# Patient Record
Sex: FEMALE | Race: WHITE
[De-identification: names, ages, dates, MRNs, and addresses within clinical notes are randomized per-mention and may not be internally consistent; named-entity substitution may affect disease eponyms.]

---

## 2017-05-12 ENCOUNTER — HOSPITAL ENCOUNTER (EMERGENCY)
Dept: HOSPITAL 45 - C.EDB | Age: 22
Discharge: HOME | End: 2017-05-12
Payer: COMMERCIAL

## 2017-05-12 VITALS
WEIGHT: 293 LBS | BODY MASS INDEX: 43.4 KG/M2 | BODY MASS INDEX: 43.4 KG/M2 | HEIGHT: 69.02 IN | WEIGHT: 293 LBS | HEIGHT: 69.02 IN

## 2017-05-12 VITALS — OXYGEN SATURATION: 96 % | SYSTOLIC BLOOD PRESSURE: 110 MMHG | HEART RATE: 107 BPM | DIASTOLIC BLOOD PRESSURE: 86 MMHG

## 2017-05-12 VITALS — TEMPERATURE: 98.06 F

## 2017-05-12 DIAGNOSIS — Z83.3: ICD-10-CM

## 2017-05-12 DIAGNOSIS — I10: ICD-10-CM

## 2017-05-12 DIAGNOSIS — Z84.1: ICD-10-CM

## 2017-05-12 DIAGNOSIS — Z79.899: ICD-10-CM

## 2017-05-12 DIAGNOSIS — Z82.49: ICD-10-CM

## 2017-05-12 DIAGNOSIS — J20.9: Primary | ICD-10-CM

## 2017-05-12 LAB
ANION GAP SERPL CALC-SCNC: 7 MMOL/L (ref 3–11)
BASOPHILS # BLD: 0.02 K/UL (ref 0–0.2)
BASOPHILS NFR BLD: 0.1 %
BUN SERPL-MCNC: 15 MG/DL (ref 7–18)
BUN/CREAT SERPL: 17.5 (ref 10–20)
CALCIUM SERPL-MCNC: 9.3 MG/DL (ref 8.5–10.1)
CHLORIDE SERPL-SCNC: 105 MMOL/L (ref 98–107)
CO2 SERPL-SCNC: 29 MMOL/L (ref 21–32)
COMPLETE: YES
CREAT CL PREDICTED SERPL C-G-VRATE: 155.7 ML/MIN
CREAT SERPL-MCNC: 0.88 MG/DL (ref 0.6–1.2)
EOSINOPHIL NFR BLD AUTO: 342 K/UL (ref 130–400)
GLUCOSE SERPL-MCNC: 91 MG/DL (ref 70–99)
HCT VFR BLD CALC: 43.4 % (ref 37–47)
IG%: 0.3 %
IMM GRANULOCYTES NFR BLD AUTO: 21.9 %
LYMPHOCYTES # BLD: 2.99 K/UL (ref 1.2–3.4)
MCH RBC QN AUTO: 27.6 PG (ref 25–34)
MCHC RBC AUTO-ENTMCNC: 33.2 G/DL (ref 32–36)
MCV RBC AUTO: 83.3 FL (ref 80–100)
MONOCYTES NFR BLD: 6.1 %
NEUTROPHILS # BLD AUTO: 3 %
NEUTROPHILS NFR BLD AUTO: 68.6 %
PMV BLD AUTO: 10.2 FL (ref 7.4–10.4)
POTASSIUM SERPL-SCNC: 4 MMOL/L (ref 3.5–5.1)
RBC # BLD AUTO: 5.21 M/UL (ref 4.2–5.4)
SODIUM SERPL-SCNC: 141 MMOL/L (ref 136–145)
WBC # BLD AUTO: 13.68 K/UL (ref 4.8–10.8)

## 2017-05-12 NOTE — EMERGENCY ROOM VISIT NOTE
History


First contact with patient:  15:56


Chief Complaint:  SHORTNESS OF BREATH


Stated Complaint:  CAN'T CATCH BREATH


Nursing Triage Summary:  


"I can't cath my breath." Reports it started yesterday and has gotten worse 


today. Denies pain





History of Present Illness


The patient is a 22 year old female who presents to the Emergency Room with 

complaints of shortness of breath.  The patient states that she has had allergy 

symptoms over the past 2 weeks, including sneezing, nasal discharge and itchy, 

watery eyes.  She reports that this week, the symptoms "moved into her chest."  

She has had difficulty breathing for the past 2 days.  She is taking Mucinex 

and Zyrtec without relief of her symptoms.  She denies any history of asthma.  

She denies any history of breathing problems.  She does not take birth control 

pills and denies any recent long travel.  She denies any personal or family 

history of blood clots or clotting disorders.  She denies any chest pain.  She 

has a mild, nonproductive cough.  She denies any fevers/chills, leg pain or 

swelling.





Review of Systems


A complete 10 point review of systems was reviewed with the patient with 

pertinent positives and negatives as per history of present illness. All else 

were negative.





Past Medical/Surgical History


Medical Problems:


(1) Hypertension








Family History





Diabetes mellitus


FH: heart disease


Hypertension


Kidney disease


Kidney stones





Social History


Smoking Status:  Never Smoker


Alcohol Use:  none


Drug Use:  none


Marital Status:  in relationship


Housing Status:  lives with family


Occupation Status:  employed





Current/Historical Medications


Scheduled


Azithromycin (Zithromax Z-Terrance), 0 PO UD


Fluoxetine (Prozac), 20 MG PO QAM


Methylprednisolone (Medrol Dosepak), 0 PO DAILY





Scheduled PRN


Guaifenesin (Mucinex Maximum Strength), 1 TAB PO BID PRN for CONGESTION





Allergies


Coded Allergies:  


     No Known Allergies (Unverified , 5/12/17)





Physical Exam


Vital Signs











  Date Time  Temp Pulse Resp B/P Pulse Ox O2 Delivery O2 Flow Rate FiO2


 


5/12/17 18:20  107 20 110/86 96   


 


5/12/17 17:03  103 20 109/76 92 Room Air  


 


5/12/17 16:56  108      


 


5/12/17 16:10     93 Room Air  


 


5/12/17 16:10      Room Air  


 


5/12/17 15:55     93 Room Air  


 


5/12/17 15:52 36.7 109 20 141/97 93 Room Air  











Physical Exam


VITALS: Vitals are noted on the nurse's note and reviewed by myself.  Vital 

signs stable.


GENERAL: This is a 22-year-old female, in no acute distress, nondiaphoretic, 

well-developed well-nourished.


SKIN: The skin was without rashes.


EARS: External auditory canals clear, tympanic membranes pearly gray without 

erythema or effusion bilaterally.


EYES: Pupils equal round and reactive to light and accommodation.  Conjunctivae 

without injection, sclerae without icterus. 


NOSE: Patent, turbinates mildly inflamed with clear discharge.


MOUTH: Mucous membranes moist.  Tonsils are not enlarged.  Pharynx without 

erythema or exudate. 


NECK: Supple without nuchal rigidity.  No lymphadenopathy.  


HEART: Regular rate and rhythm without murmurs gallops or rubs.


LUNGS: Diffuse inspiratory and expiratory wheezes.  No rhonchi or rales.  No 

accessory muscle use.


NEURO: Patient was alert and oriented to person place and time.





Medical Decision & Procedures


ER Provider


Diagnostic Interpretation:


CHEST 2 VIEWS ROUTINE





HISTORY:      cough, wheezing





COMPARISON: None.





FINDINGS: The lungs are clear. Cardiac silhouette is normal in size. No pleural


effusions. No pneumothorax.





IMPRESSION:


No acute process.





Laboratory Results


5/12/17 16:10








Red Blood Count 5.21, Mean Corpuscular Volume 83.3, Mean Corpuscular Hemoglobin 

27.6, Mean Corpuscular Hemoglobin Concent 33.2, Mean Platelet Volume 10.2, 

Neutrophils (%) (Auto) 68.6, Lymphocytes (%) (Auto) 21.9, Monocytes (%) (Auto) 

6.1, Eosinophils (%) (Auto) 3.0, Basophils (%) (Auto) 0.1, Neutrophils # (Auto) 

9.39, Lymphocytes # (Auto) 2.99, Monocytes # (Auto) 0.83, Eosinophils # (Auto) 

0.41, Basophils # (Auto) 0.02





5/12/17 16:10

















Test


  5/12/17


16:10


 


White Blood Count


  13.68 K/uL


(4.8-10.8)


 


Red Blood Count


  5.21 M/uL


(4.2-5.4)


 


Hemoglobin


  14.4 g/dL


(12.0-16.0)


 


Hematocrit 43.4 % (37-47) 


 


Mean Corpuscular Volume


  83.3 fL


()


 


Mean Corpuscular Hemoglobin


  27.6 pg


(25-34)


 


Mean Corpuscular Hemoglobin


Concent 33.2 g/dl


(32-36)


 


Platelet Count


  342 K/uL


(130-400)


 


Mean Platelet Volume


  10.2 fL


(7.4-10.4)


 


Neutrophils (%) (Auto) 68.6 % 


 


Lymphocytes (%) (Auto) 21.9 % 


 


Monocytes (%) (Auto) 6.1 % 


 


Eosinophils (%) (Auto) 3.0 % 


 


Basophils (%) (Auto) 0.1 % 


 


Neutrophils # (Auto)


  9.39 K/uL


(1.4-6.5)


 


Lymphocytes # (Auto)


  2.99 K/uL


(1.2-3.4)


 


Monocytes # (Auto)


  0.83 K/uL


(0.11-0.59)


 


Eosinophils # (Auto)


  0.41 K/uL


(0-0.5)


 


Basophils # (Auto)


  0.02 K/uL


(0-0.2)


 


RDW Standard Deviation


  40.7 fL


(36.4-46.3)


 


RDW Coefficient of Variation


  13.5 %


(11.5-14.5)


 


Immature Granulocyte % (Auto) 0.3 % 


 


Immature Granulocyte # (Auto)


  0.04 K/uL


(0.00-0.02)


 


D-Dimer


  390 ug/L FEU


(0-500)


 


Anion Gap


  7.0 mmol/L


(3-11)


 


Est Creatinine Clear Calc


Drug Dose 155.7 ml/min 


 


 


Estimated GFR (


American) 107.3 


 


 


Estimated GFR (Non-


American 92.6 


 


 


BUN/Creatinine Ratio 17.5 (10-20) 


 


Calcium Level


  9.3 mg/dl


(8.5-10.1)











Medications Administered











 Medications


  (Trade)  Dose


 Ordered  Sig/Lia


 Route  Start Time


 Stop Time Status Last Admin


Dose Admin


 


 Albuterol/


 Ipratropium


  (Duoneb)  3 ml  NOW  STAT


 INH  5/12/17 16:02


 5/12/17 16:04 DC 5/12/17 16:19


3 ML


 


 Methylprednisolone


 Sodium Succinate


  (Solu-Medrol IV)  125 mg  NOW  STAT


 IV  5/12/17 16:02


 5/12/17 16:04 DC 5/12/17 16:19


125 MG


 


 Albuterol/


 Ipratropium


  (Duoneb)  3 ml  NOW  STAT


 INH  5/12/17 17:19


 5/12/17 17:20 DC 5/12/17 17:25


3 ML











ED Course


The patient was evaluated as above.  Labs were drawn and IV access was 

obtained.  





Patient was medicated with a DuoNeb treatment and 125 mg Solu-Medrol.





Chest x-ray was performed and read by radiology as above. 





Patient was reevaluated and felt much better, but was still wheezing.  An 

additional DuoNeb treatment was ordered.





Discharge instructions were reviewed with the patient.  The patient verbalized 

understanding of my assessment and treatment plan and was discharged home in 

good condition.





Medical Decision


Differential diagnosis includes pneumonia, pulmonary embolism, asthma 

exacerbation, upper respiratory infection, among others.





The patient is a 22-year-old female who presents today complaining of 

difficulty breathing.  Exam revealed diffuse wheezes.  Labs revealed a mild 

leukocytosis.  D-dimer was not elevated.  The patient felt much better after 2 

DuoNeb treatments and IV Solu-Medrol.  I feel that she likely has an upper 

respiratory infection and possibly a component of underlying asthma.  After her 

second DuoNeb treatment, she was reevaluated and her lungs sounded much 

clearer.  Her O2 saturations in the room were between 96 and 98%.  She will be 

placed on a Z-Terrance and Medrol Dosepak.  She was given an inhaler for symptomatic 

relief.  She was instructed to follow-up with her primary care provider for 

further evaluation this week.  She will return for any worsening shortness of 

breath or new/concerning symptoms.





Based on the patient's presentation and work up, I feel the patient is stable 

for outpatient treatment.  The patient was educated to return to the emergency 

department for any worsening of their current condition or new/concerning 

symptoms.  She will follow up with her PCP.





Impression





 Primary Impression:  


 Acute bronchitis





Departure Information


Dispostion


Home / Self-Care





Condition


GOOD





Prescriptions





Methylprednisolone (MEDROL DOSEPAK) 4 Mg Terrance


0 PO DAILY, #1 PKT


   Prov: Jazzmine Shipley PA-C         5/12/17 


Azithromycin (ZITHROMAX Z-TERRANCE) 250 Mg Tab


0 PO UD, #1 PKT


   2 TABS DAY 1, THEN 1 TAB DAILY FOR 4 DAYS


   Prov: Jazzmine Shipley PA-C         5/12/17





Referrals


Sabine Sorensen PROME (PCP)





Patient Instructions


My [facility]





Additional Instructions





You were prescribed Zithromax to be taken as prescribed. This is an antibiotic. 

All antibiotics have the potential to cause diarrhea. Stop this medication and 

contact a medical provider if you were to develop any significant adverse side 

effects including: wheezing, shortness of breath, passing out, vomiting, or a 

diffuse rash. Always take antibiotics as directed and COMPLETE the ENTIRE 

course regardless of the improvement of your symptoms.





You have been prescribed a Medrol Dosepak. This is a steroid which will help 

decrease your inflammation. Take the medicine as prescribed.  Take the ENTIRE 6 

day course of the steroids.





Use the inhaler, 2 puffs every 4-6 hours as needed for shortness of breath.





Follow-up with your primary care provider this week for further evaluation.





Return to the emergency department with any worsening shortness of breath, 

fevers, or any other new/concerning symptoms.





Problem Qualifiers








 Primary Impression:  


 Acute bronchitis


 Bronchitis organism:  unspecified organism  Qualified Codes:  J20.9 - Acute 

bronchitis, unspecified

## 2017-05-12 NOTE — DIAGNOSTIC IMAGING REPORT
CHEST 2 VIEWS ROUTINE



HISTORY:      cough, wheezing



COMPARISON: None.



FINDINGS: The lungs are clear. Cardiac silhouette is normal in size. No pleural

effusions. No pneumothorax.



IMPRESSION:

No acute process.







Electronically signed by:  Davon Valle M.D.

5/12/2017 4:49 PM



Dictated Date/Time:  5/12/2017 4:48 PM

## 2018-02-28 ENCOUNTER — HOSPITAL ENCOUNTER (INPATIENT)
Dept: HOSPITAL 45 - C.LD | Age: 23
LOS: 2 days | Discharge: HOME | End: 2018-03-02
Attending: OBSTETRICS & GYNECOLOGY | Admitting: OBSTETRICS & GYNECOLOGY
Payer: COMMERCIAL

## 2018-02-28 VITALS
WEIGHT: 293 LBS | BODY MASS INDEX: 43.4 KG/M2 | BODY MASS INDEX: 43.4 KG/M2 | WEIGHT: 293 LBS | HEIGHT: 69.02 IN | HEIGHT: 69.02 IN

## 2018-02-28 DIAGNOSIS — Z3A.40: ICD-10-CM

## 2018-02-28 LAB
ALBUMIN SERPL-MCNC: 2.4 GM/DL (ref 3.4–5)
ALP SERPL-CCNC: 107 U/L (ref 45–117)
ALT SERPL-CCNC: 19 U/L (ref 12–78)
AST SERPL-CCNC: 13 U/L (ref 15–37)
BUN SERPL-MCNC: 12 MG/DL (ref 7–18)
CALCIUM SERPL-MCNC: 8.9 MG/DL (ref 8.5–10.1)
CO2 SERPL-SCNC: 22 MMOL/L (ref 21–32)
CREAT SERPL-MCNC: 0.62 MG/DL (ref 0.6–1.2)
EOSINOPHIL NFR BLD AUTO: 264 K/UL (ref 130–400)
GLUCOSE SERPL-MCNC: 96 MG/DL (ref 70–99)
HCT VFR BLD CALC: 36.7 % (ref 37–47)
HGB BLD-MCNC: 12.3 G/DL (ref 12–16)
MCH RBC QN AUTO: 27.6 PG (ref 25–34)
MCHC RBC AUTO-ENTMCNC: 33.5 G/DL (ref 32–36)
MCV RBC AUTO: 82.3 FL (ref 80–100)
PMV BLD AUTO: 10.2 FL (ref 7.4–10.4)
POTASSIUM SERPL-SCNC: 4.1 MMOL/L (ref 3.5–5.1)
PROT SERPL-MCNC: 6.6 GM/DL (ref 6.4–8.2)
RED CELL DISTRIBUTION WIDTH CV: 14.6 % (ref 11.5–14.5)
RED CELL DISTRIBUTION WIDTH SD: 43.5 FL (ref 36.4–46.3)
SODIUM SERPL-SCNC: 137 MMOL/L (ref 136–145)
WBC # BLD AUTO: 9.26 K/UL (ref 4.8–10.8)

## 2018-02-28 PROCEDURE — 3E033VJ INTRODUCTION OF OTHER HORMONE INTO PERIPHERAL VEIN, PERCUTANEOUS APPROACH: ICD-10-PCS | Performed by: OBSTETRICS & GYNECOLOGY

## 2018-02-28 RX ADMIN — PENICILLIN G POTASSIUM PRN MLS/HR: 20000000 POWDER, FOR SOLUTION INTRAVENOUS at 17:03

## 2018-02-28 RX ADMIN — SODIUM CHLORIDE, SODIUM LACTATE, POTASSIUM CHLORIDE, AND CALCIUM CHLORIDE SCH MLS/HR: 600; 310; 30; 20 INJECTION, SOLUTION INTRAVENOUS at 19:07

## 2018-02-28 RX ADMIN — PENICILLIN G POTASSIUM PRN MLS/HR: 20000000 POWDER, FOR SOLUTION INTRAVENOUS at 13:04

## 2018-02-28 RX ADMIN — SODIUM CHLORIDE, SODIUM LACTATE, POTASSIUM CHLORIDE, AND CALCIUM CHLORIDE SCH MLS/HR: 600; 310; 30; 20 INJECTION, SOLUTION INTRAVENOUS at 09:07

## 2018-02-28 NOTE — ANESTHESIA PROCEDURE NOTE
Anesthesia Epidural Removal Nt


Date & Time


Feb 28, 2018 at 21:45





Vital Signs


Pain Intensity:  0.0





Notes


Mental Status:  alert / awake / arousable, participated in evaluation


Nausea / Vomiting:  adequately controlled


Pain:  adequately controlled


Airway Patency, RR, SpO2:  stable & adequate


BP & HR:  stable & adequate


Hydration State:  stable & adequate


Neuraxial Anesthesia:  was administered


Anesthetic Complications:  no major complications apparent, pt satisfied with 

anesthetic care


Epidural:  removed without complications, with tip intact

## 2018-02-28 NOTE — DELIVERY SUMMARY
DATE OF OPERATION:  02/28/2018

 

TIME OF DELIVERY OF THE BABY:  2101 p.m. 

 

DELIVERY OF PLACENTA:  2107 p.m.

 

DETAILS OF DELIVERY:  The patient was found to be fully dilated and desired

to push.  She pushed with 3 contractions and delivered the head without

difficulty.  There was a nuchal cord around the neck x1 which was reduced. 

Shoulders were delivered with minimal traction.  Baby was handed off to the

mother where mouth and nose were suctioned.  Cord was clamped x2 and cut at 1

minute delay and then cord blood was obtained.  Baby was taken  by

the nursery team. See their note for details. Vagina and perineum were checked 
for lacerations, there

were no lacerations found and vagina and perineum were intact.  Then placenta

was found to be in the vagina and delivered spontaneous as intact and

complete.  Uterus was explored and found to be empty.  Lower segment was

cleared of all clots and debris.  Fundus was firm.  EBL was 200.  Mom and

baby tolerated the procedure well.  All sponge, lap instrument counts were

correct x2.  Baby was a viable male infant.  Apgars 8/9, weight is 3450 gr. 

No complications happened.  Mom and baby tolerated the procedure well and I

was present during the whole procedure.

 

 

I attest to the content of the Intraoperative Record and any orders documented 
therein. Any exceptions are noted below.

 

 

 

MTDD

## 2018-03-01 VITALS — TEMPERATURE: 97.7 F | SYSTOLIC BLOOD PRESSURE: 118 MMHG | DIASTOLIC BLOOD PRESSURE: 78 MMHG | HEART RATE: 83 BPM

## 2018-03-01 VITALS
SYSTOLIC BLOOD PRESSURE: 119 MMHG | DIASTOLIC BLOOD PRESSURE: 78 MMHG | OXYGEN SATURATION: 98 % | HEART RATE: 80 BPM | TEMPERATURE: 98.24 F

## 2018-03-01 VITALS — OXYGEN SATURATION: 97 % | HEART RATE: 85 BPM | TEMPERATURE: 98.24 F

## 2018-03-01 VITALS
DIASTOLIC BLOOD PRESSURE: 58 MMHG | HEART RATE: 96 BPM | OXYGEN SATURATION: 99 % | TEMPERATURE: 98.24 F | SYSTOLIC BLOOD PRESSURE: 106 MMHG

## 2018-03-01 VITALS
HEART RATE: 96 BPM | DIASTOLIC BLOOD PRESSURE: 72 MMHG | OXYGEN SATURATION: 98 % | TEMPERATURE: 98.78 F | SYSTOLIC BLOOD PRESSURE: 113 MMHG

## 2018-03-01 VITALS — DIASTOLIC BLOOD PRESSURE: 82 MMHG | HEART RATE: 80 BPM | SYSTOLIC BLOOD PRESSURE: 119 MMHG | TEMPERATURE: 98.06 F

## 2018-03-01 VITALS
OXYGEN SATURATION: 98 % | SYSTOLIC BLOOD PRESSURE: 116 MMHG | DIASTOLIC BLOOD PRESSURE: 77 MMHG | HEART RATE: 78 BPM | TEMPERATURE: 97.88 F

## 2018-03-01 LAB
HCT VFR BLD CALC: 31.8 % (ref 37–47)
HGB BLD-MCNC: 10.7 G/DL (ref 12–16)

## 2018-03-01 RX ADMIN — Medication PRN MG: at 17:07

## 2018-03-01 RX ADMIN — Medication SCH TAB: at 08:33

## 2018-03-01 RX ADMIN — DOCUSATE SODIUM SCH MG: 100 CAPSULE, LIQUID FILLED ORAL at 08:33

## 2018-03-01 RX ADMIN — Medication PRN MG: at 04:09

## 2018-03-01 RX ADMIN — DOCUSATE SODIUM SCH MG: 100 CAPSULE, LIQUID FILLED ORAL at 20:52

## 2018-03-01 RX ADMIN — FERROUS SULFATE TAB EC 325 MG (65 MG FE EQUIVALENT) SCH MG: 325 (65 FE) TABLET DELAYED RESPONSE at 08:33

## 2018-03-02 VITALS
SYSTOLIC BLOOD PRESSURE: 100 MMHG | OXYGEN SATURATION: 97 % | HEART RATE: 84 BPM | DIASTOLIC BLOOD PRESSURE: 63 MMHG | TEMPERATURE: 98.06 F

## 2018-03-02 VITALS — HEART RATE: 85 BPM | TEMPERATURE: 98.06 F | DIASTOLIC BLOOD PRESSURE: 93 MMHG

## 2018-03-02 VITALS
SYSTOLIC BLOOD PRESSURE: 138 MMHG | HEART RATE: 85 BPM | DIASTOLIC BLOOD PRESSURE: 93 MMHG | TEMPERATURE: 98.06 F | OXYGEN SATURATION: 98 %

## 2018-03-02 LAB
EOSINOPHIL NFR BLD AUTO: 246 K/UL (ref 130–400)
HCT VFR BLD CALC: 38.4 % (ref 37–47)
HGB BLD-MCNC: 12.7 G/DL (ref 12–16)
MCH RBC QN AUTO: 27.3 PG (ref 25–34)
MCHC RBC AUTO-ENTMCNC: 33.1 G/DL (ref 32–36)
MCV RBC AUTO: 82.6 FL (ref 80–100)
PMV BLD AUTO: 10.7 FL (ref 7.4–10.4)
RED CELL DISTRIBUTION WIDTH CV: 14.7 % (ref 11.5–14.5)
RED CELL DISTRIBUTION WIDTH SD: 44.2 FL (ref 36.4–46.3)
WBC # BLD AUTO: 9.58 K/UL (ref 4.8–10.8)

## 2018-03-02 RX ADMIN — Medication PRN MG: at 08:16

## 2018-03-02 RX ADMIN — FERROUS SULFATE TAB EC 325 MG (65 MG FE EQUIVALENT) SCH MG: 325 (65 FE) TABLET DELAYED RESPONSE at 07:38

## 2018-03-02 RX ADMIN — Medication SCH TAB: at 07:38

## 2018-03-02 RX ADMIN — DOCUSATE SODIUM SCH MG: 100 CAPSULE, LIQUID FILLED ORAL at 07:38

## 2018-03-02 NOTE — DISCHARGE INSTRUCTIONS
Discharge Instructions


Date of Service


Mar 2, 2018.





Admission


Reason for Admission:  Induction





Discharge


Discharge Diagnosis / Problem:  term pregnancy delivered





Discharge Goals


Goal(s):  Routine recovery after delivery, Routine recovery after 





Activity Recommendations


Activity Limitations:  as noted below


Lifting Limitations:  no more than 10 pounds


Exercise/Sports Limitations:  gradually increase as tolerated


May Resume Sexual Activity:  after follow-up appointment


Shower/Bathe:  no limitations


Driving or Machine Use:  no limitations





.





Instructions / Follow-Up


Instructions / Follow-Up





ACTIVITY RECOMMENDATIONS:





* Gradual return to full activity over the next 2-3 weeks.


* No lifting - nothing heavier than baby over the next 2-3 weeks.


* Do not engage in vigorous exercise, sexual activity or sports until cleared 

by 


   your physician.


* Do not drive or operate any motorized equipment until cleared by your 

physician.


* You may shower/bathe daily.








BREAST CARE:





If you are not breast feeding:





*  Wear a supportive bra 24 hours a day for one to two weeks.


*  Avoid stimulating your breasts and nipples as much as possible during the


    first few weeks after delivery.


*  When taking a shower, have the warm water hit your back, not breasts.


*  When your breasts feel full, apply ice packs.  Usually three to four times 


    a day helps ease the discomfort.


*  Take a mild pain medication (Tylenol/Motrin) when you are uncomfortable.





If breast feeding:





*  Use breast milk to lubricate nipples.  Lansinoh cream may be used for sore 

nipples. 


    You do not need to remove cream prior to breast feeding.  If using a 

different 


    brand of cream, check the label for directions regarding removal of cream 

prior 


    to nursing.


*  Wear a supportive bra.


*  If having problems with breasts or breast feeding, call a lactation 

consultant or 


    your health care provider.








EPISIOTOMY CARE:





After delivery, if you have an episiotomy (stitches), the following steps will 


ease discomfort and aid healing.





*   For the first 24 hours after delivery, place ice packs next to your 

episiotomy 


    to help reduce swelling.


*  After the first 24 hour-period, sitz baths, either portable or in the tub, 

are 


    suggested.  A shower with a shower arm sprayed over the episiotomy may 


    be comforting.


*  Demi care should be done after each voiding and bowel movement.  Squirt 


    warm water from a plastic bottle over the perineum (region of the body 


    between the anus and urinary opening) and pat dry.


*  Use Dermoplast to ease discomfort.  Shake container.  Spray directly over


    the episiotomy.  


*  Place a Tucks on a clean sanitary pad next to your episiotomy.








OVER THE COUNTER MEDICATION:





*  For discomfort or pain, you may use Acetaminophen (Tylenol), Ibuprofen (Advil

), or 


    Naproxen (Aleve) following the package directions. 


*  For constipation you may use Colace following the package directions.








SPECIAL CARE INSTRUCTIONS:





When you are discharged from the hospital, it is important for you to follow 


the instructions listed below:





*  During the first week at home, you should be able to care for yourself and


    your baby.  In addition, the usual light household activities are 

encouraged.





*  Limit your activities to the way you feel.  Do not try to clean the house or 


    move furniture.  Be sensible.





*  If you actively engage in sports and have done so up until the time of your


   delivery, you may resume these activities as soon as you feel able.  This may


   take up to one month or even longer.  Use good judgment.





*  Continue to take your prenatal vitamins for at least six weeks after the 

birth


    of your baby.





*  Your diet need not be limited unless you were on a special diet before your 


    delivery.  Breast-feeding mothers need around 2500 calories per day and at


    least 64-80 ounces of fluid per day (8 to 10 glasses).





*  You should eat foods from the four major food groups.  Crash diets or fad 

diets


    are to be avoided.  Eating lean meats, fresh fruits and vegetables, low-fat 


    dairy products, high fiber foods and a regular exercise program, will help 

you 


    get back to your pre-pregnancy weight without putting your health at risk.





*  Constipation is sometimes a problem after delivery.  Take a mild laxative as 


    needed.  If breast feeding, Milk of Magnesia is acceptable to use. You may 


    use a suppository or Fleets enema if no episiotomy.





*  A daily shower or tub bath is suggested.  Be sure to thoroughly and gently 


    dry the perineum.





*  A bloody vaginal discharge will usually continue until around four weeks post


    partum.  A small amount of bleeding may continue for as long as six weeks.  


    Vaginal discharge changes from the bright red bleeding after delivery to 

pink 


    then brownish and finally yellowish-pink before becoming white and 

disappearing.





*  Bleeding may increase with activity.  Your first period may come in 4-8 

weeks.


    If you are breast feeding, your period may be delayed even longer.





*  Wenden (sex) can begin whenever both you and your partner feel 

comfortable


    and do not have any form of genital infection.  It is recommended that you 

wait


    until after your return postpartum appointment and discuss with your 

physician.


    If you have questions, please talk to your health care practitioner.  A 

condom 


    should be used to prevent infection and pregnancy.





*  Foreplay, gentle intercourse and lubrication is very important the first 

several 


    times to prevent pain.  A water-based lubricant such as K-Y jelly or 

Astroglide 


    may be used.





*  Tampons may be used six weeks after delivery.





*  Douching should be avoided for 6 weeks after delivery.





*  If you have RH negative blood and your baby is RH positive, you will receive 


    RHOGAM by injection prior to discharge.  The nurse will give you a card to 


    keep with you that has the date and place that you received RHOGAM after 


    delivery.





*  During your prenatal care, you had a Rubella screen done to check for the 


    presence of rubella antibodies in your blood.  If your test was negative, 

you


    will receive a Rubella vaccine prior to discharge.  This vaccine may cause 

a 


    fever, soreness at the injection site and flu-like symptoms.  If these 

symptoms


    persist, notify your health care practitioner.   Pregnancy is not advised 

for 


    three months after a Rubella vaccine.  There is a higher chance of having a 


    baby with birth defects if conceived within three months of getting the 

vaccine.





*  If you were discharged 24 hours from delivery or before 48 hours: Visiting 

nurses 


    will come to your home 48 hours after discharge to assess you and your 

baby.  


    The visiting nurse will meet with you while you are in the hospital to 

arrange a 


    time and get directions to your home.





*  Verbalizes understanding of car seat law as reviewed with patient nursing.





*  Car Seat hand-out given and reviewed with patient by nursing.





*  Shaken baby information reviewed with patient by nursing.


 





Call you doctor if:





*  Heavy bleeding (saturating several pads an hour) or passing clots the size 

of 


    your fist.


*  A fever >101 degrees F (38.3 degrees C) on two occasions four hours apart 


    and/or chills.


*  Unusual pain in the pelvic or vaginal areas.


*  "Baby Blues" lasting longer than two weeks.





If you have any questions or concerns, call your health care practitioner 


at [phone].








FOLLOW-UP VISIT:





*  Please call the office at [phone] to schedule a 6 week postpartum


    examination.  It is important you keep this appointment.  


*  It is important for you to make arrangements for either yearly or twice 


    yearly check-ups thereafter.





Current Hospital Diet


Patient's current hospital diet: Regular OB Diet





Discharge Diet


Recommended Diet:  Kosher Diet, Regular OB Diet





Pending Studies


Studies pending at discharge:  no





Medical Emergencies








.


Who to Call and When:





Medical Emergencies:  If at any time you feel your situation is an emergency, 

please call 911 immediately.





.





Non-Emergent Contact


Non-Emergency issues call your:  Primary Care Provider





.


.








"Provider Documentation" section prepared by Romeo Brock.








.

## 2018-03-02 NOTE — OB/GYN PROGRESS NOTE
OB/GYN Progress Note


Date of Service


Mar 2, 2018.





Subjective


conversation w/ patient, physical exam


Ambulation:  ambulating normally


Voiding:  no voiding problems


Passing Gas:  Yes


Diet Tolerance:  Regular Diet


Lochia:  Small


Feeding Type:  Bottle Feeding





Objective


Vital Signs











  Date Time  Temp Pulse Resp B/P (MAP) Pulse Ox O2 Delivery O2 Flow Rate FiO2


 


3/1/18 23:30      Room Air  


 


3/1/18 23:30 36.7 80 18 119/82 (94)  Room Air  


 


3/1/18 20:59 36.5 83 16 118/78 (91)  Room Air  


 


3/1/18 15:40 36.8 85 18  97 Room Air  


 


3/1/18 15:40      Room Air  


 


3/1/18 11:30 36.6 78 16 116/77 (90) 98 Room Air  











Physical Exam


General Appearance:  WELL-APPEARING, NO APPARENT DISTRESS


Abdomen:  non tender, soft


Fundus:  Firm


Extremities:  non-tender





Laboratory Results





Last 24 Hours








Test


  3/2/18


08:37


 


White Blood Count 9.58 K/uL 


 


Red Blood Count 4.65 M/uL 


 


Hemoglobin 12.7 g/dL 


 


Hematocrit 38.4 % 


 


Mean Corpuscular Volume 82.6 fL 


 


Mean Corpuscular Hemoglobin 27.3 pg 


 


Mean Corpuscular Hemoglobin


Concent 33.1 g/dl 


 


 


RDW Standard Deviation 44.2 fL 


 


RDW Coefficient of Variation 14.7 % 


 


Platelet Count 246 K/uL 


 


Mean Platelet Volume 10.7 fL 











Assessment and Plan


Post-Partum


Day Number:  2


Continue Routine Care:


discharged